# Patient Record
Sex: MALE | Race: WHITE
[De-identification: names, ages, dates, MRNs, and addresses within clinical notes are randomized per-mention and may not be internally consistent; named-entity substitution may affect disease eponyms.]

---

## 2021-07-13 PROBLEM — Z00.00 ENCOUNTER FOR PREVENTIVE HEALTH EXAMINATION: Status: ACTIVE | Noted: 2021-07-13

## 2021-07-22 ENCOUNTER — APPOINTMENT (OUTPATIENT)
Dept: THORACIC SURGERY | Facility: CLINIC | Age: 71
End: 2021-07-22
Payer: MEDICARE

## 2021-07-22 PROCEDURE — 99203 OFFICE O/P NEW LOW 30 MIN: CPT

## 2021-07-22 RX ORDER — LEVOFLOXACIN 750 MG/1
750 TABLET, FILM COATED ORAL DAILY
Qty: 10 | Refills: 0 | Status: ACTIVE | COMMUNITY
Start: 2021-07-22 | End: 1900-01-01

## 2021-07-27 NOTE — HISTORY OF PRESENT ILLNESS
[FreeTextEntry1] : 71 year year old male, nonsmoker, with a PMHx of HTN, HLD, chronic non-productive cough since May with recent CT chest revealing clustered nodular and branching opacities in the RUL and RML. He presents today for an initial evaluation. He is referred by Dr. Trent Hernandez. \par \par CXR completed on 6/19/21:\par - lateral right midlung field density not identified as such previously \par \par PFT done on 6/17/21 showed FEV1 = 2.76, 103% of predicted value, FVC = 3.36, 84% of predicted value, PEF =7.6 ,99 % of predicted value and DLCO = 24.3 , 160% of predicted value.\par \par CT chest completed on 7/6/21:\par - clustered nodular and branching opacities in the right upper lobe and middle lobe. Findings are likely to be either infectious or related to aspiration. Multifocal neoplasm is much less likely. None of the nodules are cavitary.\par -the largest nodular opacity in this region measure up to 6mm\par -the largest nodular opacity in this region measures 15mm\par -none of the nodular opacities is cavity\par \par

## 2021-07-27 NOTE — CONSULT LETTER
[Dear  ___] : Dear  [unfilled], [Consult Letter:] : I had the pleasure of evaluating your patient, [unfilled]. [Please see my note below.] : Please see my note below. [Consult Closing:] : Thank you very much for allowing me to participate in the care of this patient.  If you have any questions, please do not hesitate to contact me. [Sincerely,] : Sincerely, [FreeTextEntry1] : Trent Hernandez  [FreeTextEntry3] : Dr. Masood Reinoso

## 2021-07-27 NOTE — ASSESSMENT
[FreeTextEntry1] : 71 year year old male, nonsmoker, with a PMHx of HTN, HLD, chronic non-productive cough since May with recent CT chest revealing clustered nodular and branching opacities in the RUL and RML. He presents today for an initial evaluation. He is referred by Dr. Trent Hernandez.\par \par Patient admits to a chronic non=productive cough since May. He was evaluated by Dr. Juan David Santiago and treated with Doxycycline for possible URI. He was sent for a CT chest which revealed multiple lung opacities likely infectious vs. inflammatory in nature. Will prescribe levaquin for ten day and reevaluate with a CT chest in four weeks. The incentive spirometer was provided to the patient and taught how to use. I explained that if there is no improvement in the lung nodules then determination between observation vs. biopsy vs. resection will be determined. \par \par Plan:\par 1. Levaquin x 10 days \par 2. Repeat CT chest in 4 weeks and RTC\par \par I, DAFNE LANE , am scribing for and in the presence of [Dr. Masood Reinoso] the following sections: History of present illness, past Medical/family/surgical/family/social history, review of systems, vital signs, physical exam and disposition.\par  \par I reviewed the documentation recorded by the scribe in my presence and it accurately and completely records my words and actions\par \par

## 2021-09-02 ENCOUNTER — APPOINTMENT (OUTPATIENT)
Dept: THORACIC SURGERY | Facility: CLINIC | Age: 71
End: 2021-09-02
Payer: MEDICARE

## 2021-09-09 ENCOUNTER — APPOINTMENT (OUTPATIENT)
Dept: THORACIC SURGERY | Facility: CLINIC | Age: 71
End: 2021-09-09
Payer: MEDICARE

## 2021-09-09 VITALS
HEIGHT: 68 IN | TEMPERATURE: 97.5 F | HEART RATE: 55 BPM | OXYGEN SATURATION: 99 % | DIASTOLIC BLOOD PRESSURE: 85 MMHG | SYSTOLIC BLOOD PRESSURE: 165 MMHG | WEIGHT: 122 LBS | BODY MASS INDEX: 18.49 KG/M2

## 2021-09-09 DIAGNOSIS — R91.8 OTHER NONSPECIFIC ABNORMAL FINDING OF LUNG FIELD: ICD-10-CM

## 2021-09-09 DIAGNOSIS — J06.9 ACUTE UPPER RESPIRATORY INFECTION, UNSPECIFIED: ICD-10-CM

## 2021-09-09 DIAGNOSIS — Z86.79 PERSONAL HISTORY OF OTHER DISEASES OF THE CIRCULATORY SYSTEM: ICD-10-CM

## 2021-09-09 DIAGNOSIS — Z86.39 PERSONAL HISTORY OF OTHER ENDOCRINE, NUTRITIONAL AND METABOLIC DISEASE: ICD-10-CM

## 2021-09-09 PROCEDURE — 99213 OFFICE O/P EST LOW 20 MIN: CPT

## 2021-09-09 NOTE — PHYSICAL EXAM
[] : no respiratory distress [Respiration, Rhythm And Depth] : normal respiratory rhythm and effort [Examination Of The Chest] : the chest was normal in appearance [Exaggerated Use Of Accessory Muscles For Inspiration] : no accessory muscle use [Oriented To Time, Place, And Person] : oriented to person, place, and time [Impaired Insight] : insight and judgment were intact [Affect] : the affect was normal

## 2021-09-11 NOTE — END OF VISIT
[FreeTextEntry3] : I, ADELAIDA BASS , am scribing for and in the presence of GITA ZUÑIGA the following sections: history of present illness, past medical/family/surgical/family/social history, review of systems, vital signs, physical exam, and disposition.\par \par

## 2021-09-11 NOTE — HISTORY OF PRESENT ILLNESS
[FreeTextEntry1] : 71 year year old male, nonsmoker, with a PMHx of HTN, HLD, chronic non-productive cough since May with recent CT chest revealing clustered nodular and branching opacities in the RUL and RML. He presents today to review CT chest s/p treatment with Levaquin for multiple lung opacities.. He is referred by Dr. Trent Hernandez. \par \par CXR completed on 6/19/21:\par - lateral right midlung field density not identified as such previously \par \par PFT done on 6/17/21 showed FEV1 = 2.76, 103% of predicted value, FVC = 3.36, 84% of predicted value, PEF =7.6 ,99 % of predicted value and DLCO = 24.3 , 160% of predicted value.\par \par CT chest completed on 7/6/21:\par - clustered nodular and branching opacities in the right upper lobe and middle lobe. Findings are likely to be either infectious or related to aspiration. Multifocal neoplasm is much less likely. None of the nodules are cavitary.\par -the largest nodular opacity in this region measure up to 6mm\par -the largest nodular opacity in this region measures 15mm\par -none of the nodular opacities is cavity\par \par CT chest completed on 8/20/21:\par - tree in bud and focal opacities in the right middle lobe and  right upper lobe, minimall decreased since prior CT chest from 7/3/21\par \par \par

## 2021-09-11 NOTE — ASSESSMENT
[FreeTextEntry1] : 71 year year old male, nonsmoker, with a PMHx of HTN, HLD, chronic non-productive cough since May with recent CT chest revealing clustered nodular and branching opacities in the RUL and RML. He presents today to review CT chest s/p treatment with Levaquin for multiple lung opacities. He is referred by Dr. Trent Hernandez. \par \par Today the patient reports SOB has improved and admits to a mostly dry sometimes productive ( clear sputum) cough. \par \par CT chest completed on 8/20/21: was reviewed which revealed:\par - tree in bud and focal opacities in the right middle lobe and  right upper lobe, minimall decreased since prior CT chest from 7/3/21\par \par Explained that no growth was seen which usually means this is infectious/ inflammatory. I spoke with Dr. Juan David Santiago during the office visit who gave the patient cups for sputum samples this week.  If the sputum does not give him a diagnosis discussed that he should be set up for a bronchoscopy for a lavage to be sent for cytology and microbiology to determine a treatment plan moving forward. He should follow up here in 6 months. \par \par PLAN:\par 1. Possible bronchoscopy with Dr. Juan David Santiago \par 2. RTC in 6 months \par \par I have discussed the case with pulmonary medicine.  I concur with their plan.  The patient will follow up in 6 months.\par \par I, Masood Reinoso, have reviewed the documentation recorded by the scribe in my presence and it accurately and completely records my words and actions.

## 2022-04-25 NOTE — HISTORY OF PRESENT ILLNESS
[FreeTextEntry1] : 72 year year old male, nonsmoker, with a PMHx of HTN, HLD, chronic non-productive cough since May with recent CT chest revealing clustered nodular and branching opacities in the RUL and RML. He presents today for a follow up visit s/p bronchoscopy with Dr. Juan David Santiago?. He is referred by Dr. Trent Hernandez. \par \par CXR completed on 6/19/21:\par - lateral right midlung field density not identified as such previously \par \par PFT done on 6/17/21 showed FEV1 = 2.76, 103% of predicted value, FVC = 3.36, 84% of predicted value, PEF =7.6 ,99 % of predicted value and DLCO = 24.3 , 160% of predicted value.\par \par CT chest completed on 7/6/21:\par - clustered nodular and branching opacities in the right upper lobe and middle lobe. Findings are likely to be either infectious or related to aspiration. Multifocal neoplasm is much less likely. None of the nodules are cavitary.\par -the largest nodular opacity in this region measure up to 6mm\par -the largest nodular opacity in this region measures 15mm\par -none of the nodular opacities is cavity\par \par CT chest completed on 8/20/21:\par - tree in bud and focal opacities in the right middle lobe and right upper lobe, minimall decreased since prior CT chest from 7/3/21\par

## 2022-04-28 ENCOUNTER — APPOINTMENT (OUTPATIENT)
Dept: THORACIC SURGERY | Facility: CLINIC | Age: 72
End: 2022-04-28

## 2023-07-08 ENCOUNTER — EMERGENCY (EMERGENCY)
Facility: HOSPITAL | Age: 73
LOS: 0 days | Discharge: HOME | End: 2023-07-08
Attending: EMERGENCY MEDICINE
Payer: MEDICARE

## 2023-07-08 VITALS
SYSTOLIC BLOOD PRESSURE: 159 MMHG | RESPIRATION RATE: 18 BRPM | OXYGEN SATURATION: 99 % | DIASTOLIC BLOOD PRESSURE: 77 MMHG | HEART RATE: 66 BPM | WEIGHT: 154.98 LBS | HEIGHT: 69 IN | TEMPERATURE: 99 F

## 2023-07-08 DIAGNOSIS — R07.89 OTHER CHEST PAIN: ICD-10-CM

## 2023-07-08 DIAGNOSIS — I10 ESSENTIAL (PRIMARY) HYPERTENSION: ICD-10-CM

## 2023-07-08 DIAGNOSIS — E78.5 HYPERLIPIDEMIA, UNSPECIFIED: ICD-10-CM

## 2023-07-08 LAB
ALBUMIN SERPL ELPH-MCNC: 4.3 G/DL — SIGNIFICANT CHANGE UP (ref 3.5–5.2)
ALP SERPL-CCNC: 104 U/L — SIGNIFICANT CHANGE UP (ref 30–115)
ALT FLD-CCNC: 20 U/L — SIGNIFICANT CHANGE UP (ref 0–41)
ANION GAP SERPL CALC-SCNC: 11 MMOL/L — SIGNIFICANT CHANGE UP (ref 7–14)
AST SERPL-CCNC: 27 U/L — SIGNIFICANT CHANGE UP (ref 0–41)
BASOPHILS # BLD AUTO: 0.04 K/UL — SIGNIFICANT CHANGE UP (ref 0–0.2)
BASOPHILS NFR BLD AUTO: 0.5 % — SIGNIFICANT CHANGE UP (ref 0–1)
BILIRUB SERPL-MCNC: 0.4 MG/DL — SIGNIFICANT CHANGE UP (ref 0.2–1.2)
BUN SERPL-MCNC: 25 MG/DL — HIGH (ref 10–20)
CALCIUM SERPL-MCNC: 9.7 MG/DL — SIGNIFICANT CHANGE UP (ref 8.4–10.5)
CHLORIDE SERPL-SCNC: 103 MMOL/L — SIGNIFICANT CHANGE UP (ref 98–110)
CO2 SERPL-SCNC: 28 MMOL/L — SIGNIFICANT CHANGE UP (ref 17–32)
CREAT SERPL-MCNC: 1.5 MG/DL — SIGNIFICANT CHANGE UP (ref 0.7–1.5)
EGFR: 49 ML/MIN/1.73M2 — LOW
EOSINOPHIL # BLD AUTO: 0.17 K/UL — SIGNIFICANT CHANGE UP (ref 0–0.7)
EOSINOPHIL NFR BLD AUTO: 2.2 % — SIGNIFICANT CHANGE UP (ref 0–8)
GLUCOSE SERPL-MCNC: 98 MG/DL — SIGNIFICANT CHANGE UP (ref 70–99)
HCT VFR BLD CALC: 41.4 % — LOW (ref 42–52)
HGB BLD-MCNC: 14 G/DL — SIGNIFICANT CHANGE UP (ref 14–18)
IMM GRANULOCYTES NFR BLD AUTO: 0.1 % — SIGNIFICANT CHANGE UP (ref 0.1–0.3)
LYMPHOCYTES # BLD AUTO: 1.74 K/UL — SIGNIFICANT CHANGE UP (ref 1.2–3.4)
LYMPHOCYTES # BLD AUTO: 22.5 % — SIGNIFICANT CHANGE UP (ref 20.5–51.1)
MAGNESIUM SERPL-MCNC: 2.3 MG/DL — SIGNIFICANT CHANGE UP (ref 1.8–2.4)
MCHC RBC-ENTMCNC: 30.5 PG — SIGNIFICANT CHANGE UP (ref 27–31)
MCHC RBC-ENTMCNC: 33.8 G/DL — SIGNIFICANT CHANGE UP (ref 32–37)
MCV RBC AUTO: 90.2 FL — SIGNIFICANT CHANGE UP (ref 80–94)
MONOCYTES # BLD AUTO: 0.73 K/UL — HIGH (ref 0.1–0.6)
MONOCYTES NFR BLD AUTO: 9.4 % — HIGH (ref 1.7–9.3)
NEUTROPHILS # BLD AUTO: 5.05 K/UL — SIGNIFICANT CHANGE UP (ref 1.4–6.5)
NEUTROPHILS NFR BLD AUTO: 65.3 % — SIGNIFICANT CHANGE UP (ref 42.2–75.2)
NRBC # BLD: 0 /100 WBCS — SIGNIFICANT CHANGE UP (ref 0–0)
NT-PROBNP SERPL-SCNC: 168 PG/ML — SIGNIFICANT CHANGE UP (ref 0–300)
PLATELET # BLD AUTO: 170 K/UL — SIGNIFICANT CHANGE UP (ref 130–400)
PMV BLD: 11.1 FL — HIGH (ref 7.4–10.4)
POTASSIUM SERPL-MCNC: 4.4 MMOL/L — SIGNIFICANT CHANGE UP (ref 3.5–5)
POTASSIUM SERPL-SCNC: 4.4 MMOL/L — SIGNIFICANT CHANGE UP (ref 3.5–5)
PROT SERPL-MCNC: 6.7 G/DL — SIGNIFICANT CHANGE UP (ref 6–8)
RBC # BLD: 4.59 M/UL — LOW (ref 4.7–6.1)
RBC # FLD: 12.1 % — SIGNIFICANT CHANGE UP (ref 11.5–14.5)
SODIUM SERPL-SCNC: 142 MMOL/L — SIGNIFICANT CHANGE UP (ref 135–146)
TROPONIN T SERPL-MCNC: <0.01 NG/ML — SIGNIFICANT CHANGE UP
WBC # BLD: 7.74 K/UL — SIGNIFICANT CHANGE UP (ref 4.8–10.8)
WBC # FLD AUTO: 7.74 K/UL — SIGNIFICANT CHANGE UP (ref 4.8–10.8)

## 2023-07-08 PROCEDURE — 99285 EMERGENCY DEPT VISIT HI MDM: CPT | Mod: 25

## 2023-07-08 PROCEDURE — 93010 ELECTROCARDIOGRAM REPORT: CPT

## 2023-07-08 PROCEDURE — 36415 COLL VENOUS BLD VENIPUNCTURE: CPT

## 2023-07-08 PROCEDURE — 93005 ELECTROCARDIOGRAM TRACING: CPT

## 2023-07-08 PROCEDURE — 84484 ASSAY OF TROPONIN QUANT: CPT

## 2023-07-08 PROCEDURE — 85025 COMPLETE CBC W/AUTO DIFF WBC: CPT

## 2023-07-08 PROCEDURE — 99285 EMERGENCY DEPT VISIT HI MDM: CPT

## 2023-07-08 PROCEDURE — 83880 ASSAY OF NATRIURETIC PEPTIDE: CPT

## 2023-07-08 PROCEDURE — 71046 X-RAY EXAM CHEST 2 VIEWS: CPT | Mod: 26

## 2023-07-08 PROCEDURE — 80053 COMPREHEN METABOLIC PANEL: CPT

## 2023-07-08 PROCEDURE — 83735 ASSAY OF MAGNESIUM: CPT

## 2023-07-08 PROCEDURE — 71046 X-RAY EXAM CHEST 2 VIEWS: CPT

## 2023-07-08 NOTE — ED PROVIDER NOTE - PROGRESS NOTE DETAILS
workup neg, will dc with close cardio f/u, pt and family agreeable with plan, pt given strict return precautions    pt had normal stress echo april 2023 by his Cardio in [facility]

## 2023-07-08 NOTE — ED ADULT NURSE NOTE - NSFALLUNIVINTERV_ED_ALL_ED
Bed/Stretcher in lowest position, wheels locked, appropriate side rails in place/Call bell, personal items and telephone in reach/Instruct patient to call for assistance before getting out of bed/chair/stretcher/Non-slip footwear applied when patient is off stretcher/[place] to call system/Physically safe environment - no spills, clutter or unnecessary equipment/Purposeful proactive rounding/Room/bathroom lighting operational, light cord in reach

## 2023-07-08 NOTE — ED ADULT NURSE NOTE - NS ED PATIENT SAFETY CONCERN
Pt c/o dizziness this morning after waking up. Denies chest pain or sob. No slurred speech noted or weakness.  
No

## 2023-07-08 NOTE — ED PROVIDER NOTE - PATIENT PORTAL LINK FT
You can access the FollowMyHealth Patient Portal offered by [facility] by registering at the following website: [email or web address]. By joining [employer]’s FollowMyHealth portal, you will also be able to view your health information using other applications (apps) compatible with our system.

## 2023-07-08 NOTE — ED PROVIDER NOTE - PHYSICAL EXAMINATION
VITAL SIGNS: I have reviewed nursing notes and confirm.  CONSTITUTIONAL: Well-developed; well-nourished  SKIN: skin exam is warm and dry, no acute rash.    HEAD: Normocephalic; atraumatic.  EYES: conjunctiva and sclera clear.  ENT: No nasal discharge; airway clear.  CARD: S1, S2 normal; no murmurs, gallops, or rubs. Regular rate and rhythm.   RESP: No wheezes, rales or rhonchi.  EXT: Normal ROM.  No clubbing, cyanosis or edema.   NEURO: Alert, oriented, grossly unremarkable

## 2023-07-08 NOTE — ED ADULT TRIAGE NOTE - ESI TRIAGE ACUITY LEVEL, MLM
Vaccine Information Statement(s) was given today. This has been reviewed, questions answered, and verbal consent given by Patient for injection(s) and administration of Influenza (Inactivated).    1. Does the patient have a moderate to severe fever?  No  2. Has the patient had a serious reaction to a flu shot before?   No  3. Has the patient ever had Guillian [place] Syndrome within 6 weeks of a previous flu shot?  No  4. Is the patient less than 6 months of age?  No    Patient is eligible to receive the vaccine based on all questions being answered as 'No'.    Patient tolerated without incident. See immunization grid for documentation.         3

## 2023-07-08 NOTE — ED PROVIDER NOTE - OBJECTIVE STATEMENT
Pt is a 74y/o male with a pmhx of HTN, HLD here for eval of right sided cp intermittently x 3 days, lasting for approx 30min to 1 hr, most recently occurred while at rest earlier today which prompted visit. Pt was given full dose aspirina dn SL nitro by EMS which did not change symptoms. Pt denies fever, chills, cough

## 2023-07-08 NOTE — ED PROVIDER NOTE - NS ED ATTENDING STATEMENT MOD
This was a shared visit with the [facility]. I reviewed and verified the documentation and independently performed the documented:

## 2023-07-08 NOTE — ED PROVIDER NOTE - CLINICAL SUMMARY MEDICAL DECISION MAKING FREE TEXT BOX
73-year-old male presenting to the emergency department for chest pain.  Labs within normal limits including troponin and symptoms similar going on for 3 days with normal stress test making cardiac pathology unlikely.  EKG normal sinus rhythm.  DC home.

## 2023-07-08 NOTE — ED PROVIDER NOTE - ATTENDING APP SHARED VISIT CONTRIBUTION OF CARE
73-year-old male past medical history of hypertension, hyperlipidemia presents emergency department for 3 days of intermittent chest pain.  Patient states it feels like someone is putting a finger on his chest and pushing.  Patient had a stress test in April which was normal.  No fever no chills no shortness of breath no dyspnea on exertion.    Const: NAD  Eyes: PERRL, no conjunctival injection  HENT:  Neck supple without meningismus   CV: RRR, Warm, well-perfused extremities  RESP: CTA B/L, no tachypnea   GI: soft, non-tender, non-distended  MSK: No gross deformities appreciated  Skin: Warm, dry. No rashes  Neuro: Alert, CNs II-XII grossly intact. Sensation and motor function of extremities grossly intact.  Psych: Appropriate mood and affect.

## 2023-08-04 ENCOUNTER — OUTPATIENT (OUTPATIENT)
Dept: OUTPATIENT SERVICES | Facility: HOSPITAL | Age: 73
LOS: 1 days | End: 2023-08-04
Payer: MEDICARE

## 2023-08-04 DIAGNOSIS — M25.511 PAIN IN RIGHT SHOULDER: ICD-10-CM

## 2023-08-04 PROCEDURE — 73010 X-RAY EXAM OF SHOULDER BLADE: CPT | Mod: 26,RT

## 2023-08-04 PROCEDURE — 73060 X-RAY EXAM OF HUMERUS: CPT | Mod: RT

## 2023-08-04 PROCEDURE — 73010 X-RAY EXAM OF SHOULDER BLADE: CPT | Mod: RT

## 2023-08-04 PROCEDURE — 73060 X-RAY EXAM OF HUMERUS: CPT | Mod: 26,RT

## 2023-08-04 PROCEDURE — 73030 X-RAY EXAM OF SHOULDER: CPT | Mod: 26,RT

## 2023-08-04 PROCEDURE — 73030 X-RAY EXAM OF SHOULDER: CPT | Mod: RT

## 2023-08-05 DIAGNOSIS — M25.511 PAIN IN RIGHT SHOULDER: ICD-10-CM
